# Patient Record
Sex: FEMALE | Race: WHITE
[De-identification: names, ages, dates, MRNs, and addresses within clinical notes are randomized per-mention and may not be internally consistent; named-entity substitution may affect disease eponyms.]

---

## 2017-01-03 ENCOUNTER — HOSPITAL ENCOUNTER (EMERGENCY)
Dept: HOSPITAL 25 - ED | Age: 52
LOS: 1 days | Discharge: HOME | End: 2017-01-04
Payer: COMMERCIAL

## 2017-01-03 VITALS — DIASTOLIC BLOOD PRESSURE: 78 MMHG | SYSTOLIC BLOOD PRESSURE: 133 MMHG

## 2017-01-03 DIAGNOSIS — Z87.19: ICD-10-CM

## 2017-01-03 DIAGNOSIS — R07.9: Primary | ICD-10-CM

## 2017-01-03 LAB
ALBUMIN SERPL BCG-MCNC: 4.4 G/DL (ref 3.2–5.2)
ALP SERPL-CCNC: 80 U/L (ref 34–104)
ALT SERPL W P-5'-P-CCNC: 25 U/L (ref 7–52)
ANION GAP SERPL CALC-SCNC: 8 MMOL/L (ref 2–11)
AST SERPL-CCNC: 18 U/L (ref 13–39)
BUN SERPL-MCNC: 18 MG/DL (ref 6–24)
BUN/CREAT SERPL: 22.2 (ref 8–20)
CALCIUM SERPL-MCNC: 9.7 MG/DL (ref 8.6–10.3)
CHLORIDE SERPL-SCNC: 103 MMOL/L (ref 101–111)
CK SERPL-CCNC: 58 U/L (ref 10–223)
GLOBULIN SER CALC-MCNC: 2.9 G/DL (ref 2–4)
GLUCOSE SERPL-MCNC: 103 MG/DL (ref 70–100)
HCO3 SERPL-SCNC: 27 MMOL/L (ref 22–32)
HCT VFR BLD AUTO: 41 % (ref 35–47)
HGB BLD-MCNC: 13.5 G/DL (ref 12–16)
MCH RBC QN AUTO: 28 PG (ref 27–31)
MCHC RBC AUTO-ENTMCNC: 33 G/DL (ref 31–36)
MCV RBC AUTO: 84 FL (ref 80–97)
POTASSIUM SERPL-SCNC: 3.8 MMOL/L (ref 3.5–5)
PROT SERPL-MCNC: 7.3 G/DL (ref 6.4–8.9)
RBC # BLD AUTO: 4.81 10^6/UL (ref 4–5.4)
SODIUM SERPL-SCNC: 138 MMOL/L (ref 133–145)
TROPONIN I SERPL-MCNC: 0.02 NG/ML (ref ?–0.04)
WBC # BLD AUTO: 8.2 10^3/UL (ref 3.5–10.8)

## 2017-01-03 PROCEDURE — 85025 COMPLETE CBC W/AUTO DIFF WBC: CPT

## 2017-01-03 PROCEDURE — 99283 EMERGENCY DEPT VISIT LOW MDM: CPT

## 2017-01-03 PROCEDURE — 71010: CPT

## 2017-01-03 PROCEDURE — 83880 ASSAY OF NATRIURETIC PEPTIDE: CPT

## 2017-01-03 PROCEDURE — 83874 ASSAY OF MYOGLOBIN: CPT

## 2017-01-03 PROCEDURE — 83690 ASSAY OF LIPASE: CPT

## 2017-01-03 PROCEDURE — 85730 THROMBOPLASTIN TIME PARTIAL: CPT

## 2017-01-03 PROCEDURE — 82550 ASSAY OF CK (CPK): CPT

## 2017-01-03 PROCEDURE — 80053 COMPREHEN METABOLIC PANEL: CPT

## 2017-01-03 PROCEDURE — 84484 ASSAY OF TROPONIN QUANT: CPT

## 2017-01-03 PROCEDURE — 36415 COLL VENOUS BLD VENIPUNCTURE: CPT

## 2017-01-03 PROCEDURE — 82553 CREATINE MB FRACTION: CPT

## 2017-01-03 PROCEDURE — 83605 ASSAY OF LACTIC ACID: CPT

## 2017-01-03 PROCEDURE — 93005 ELECTROCARDIOGRAM TRACING: CPT

## 2017-01-03 PROCEDURE — 85610 PROTHROMBIN TIME: CPT

## 2017-01-03 NOTE — RAD
INDICATION: Chest pain



COMPARISON: Similar chest x-ray dated August 28, 2013

 

TECHNIQUE: Single AP portable view of the chest was obtained.



FINDINGS: 



Image quality is compromised due to the relative inferiority of a portable chest x-ray.



The heart and mediastinum exhibit normal size and contour.



The lungs are grossly clear. There is no evidence of a large pleural effusion.



Visualized bones are normal for the patient's age.



IMPRESSION:  No radiographic evidence for acute cardiopulmonary abnormality on this

portable chest x-ray.

## 2017-01-04 LAB — LIPASE SERPL-CCNC: 28 U/L (ref 11–82)

## 2017-01-06 NOTE — ED
John VOSS Rebecca, scribed for Isaac Diaz MD on 01/03/17 at 2103 .





HPI Chest Pain





- HPI Summary


HPI Summary: 


Pt is a 50 y/o F who presents to ED c/o CP. Pain began suddenly 3 days ago, 

worsened today. Pain is characterized as pressure and heat, currently ranked as 

moderate (6/10). Pain is in the low sternum with radiation to the back, between 

the shoulder blades. Sx aggravated by coffee, slightly alleviated by Prilosec. 

Additionally c/o belching and shaking. Denies SOB, nausea, diaphoresis, diarrhea

, changes in urinary frequency, fever, chills, cough, and melena. PMHx GERD, 

pancreatitis. Last stress test was a few years ago. Prior similar episodes 

during pancreatitis, GERD, and food/medication allergy reactions. 





IF THERE IS ONE, PLEASE SEE DICTATION BY DR. DIAZ FOR FURTHER INFORMATION.





- History of Current Complaint


Chief Complaint: EDChestPainROMI


Time Seen by Provider: 01/03/17 20:57


Hx Obtained From: Patient


Onset/Duration: Started Days Ago - 3 days ago, Still Present, Worse Since - 

today


Timing: Constant


Initial Severity: Mild


Current Severity: Moderate


Pain Intensity: 6


Pain Scale Used: 0-10 Numeric


Chest Pain Location: Lower Sternal


Chest Pain Radiates: Yes


Chest Pain Radiates To:: Back - between shoulder blades


Character: Burning, Pressure/Squeezing


Aggravating Factor(s): Caffeine - Coffee


Alleviating Factor(s): OTC Meds - Prilosec


Associated Signs and Symptoms: Positive: Other: - Belching and shaking; Denies 

diarrhea, changes in urinary frequency and melena.  Negative: Shortness of 

Breath, Fever, Chills, Diaphoresis, Nausea





- Allergy/Home Medications


Allergies/Adverse Reactions: 


 Allergies











Allergy/AdvReac Type Severity Reaction Status Date / Time


 


Adhesive Tape Allergy Intermediate Rash Verified 11/17/16 09:34





[Tegaderm Dressing]     


 


Allopurinol Allergy Intermediate Hives Verified 11/17/16 09:34


 


Cefuroxime [From Ceftin] Allergy Intermediate Rash Verified 11/17/16 09:34


 


Chicken Allergy Allergy Intermediate Anxiety Verified 11/17/16 09:34


 


Fexofenadine [From Allegra] Allergy Intermediate Rash Verified 11/17/16 09:34


 


Garlic Allergy Intermediate Rash Verified 11/17/16 09:34


 


Levofloxacin [From Levaquin] Allergy Intermediate Hives Verified 11/17/16 09:34


 


Penicillin V Allergy Intermediate Rash Verified 11/17/16 09:34


 


Sulfamethoxazole Allergy Intermediate Rash Verified 11/17/16 09:34





w/Trimethoprim     





[From Bactrim]     


 


Watermelon Flavor Allergy Intermediate Anxiety Verified 11/17/16 09:34


 


Cholecystokinin Allergy Mild Flushing Verified 11/17/16 09:34


 


Technetium-99M Allergy Mild Flushing Verified 11/17/16 09:34


 


Nitrofurantoin Allergy Unknown SEVERE Verified 11/17/16 09:34





[From Macrobid]   NAUSEA AND  





   HIVES  


 


Hydrochlorothiazide Allergy  ITCHY Verified 11/17/16 09:34





   HIVES AND  





   FLUSHING  


 


Latex Allergy  Rash Verified 11/17/16 09:34


 


Oxycodone Allergy  FLUSHING Verified 11/17/16 09:34





   AND ITCHY  





   HIVES  


 


Statins Allergy  SEVERLY Verified 11/17/16 09:34





   DEBILITATED  


 


Sulfa Antibiotics Allergy  Unknown Verified 01/03/17 20:28





   Reaction  





   Details  


 


peppers Allergy Intermediate Anxiety Uncoded 11/17/16 09:34


 


treenuts Allergy Intermediate Anxiety Uncoded 11/17/16 09:34














PMH/Surg Hx/FS Hx/Imm Hx


Endocrine/Hematology History: 


   Denies: Hx Diabetes


Cardiovascular History: Reports: Hx Hypercholesterolemia, Hx Hypertension


   Denies: Hx Pacemaker/ICD


Respiratory History: Reports: Hx Asthma


GI History: Reports: Hx Gastroesophageal Reflux Disease, Other GI Disorders - 

Hx pancreatitis


 History: Reports: Hx Kidney Stones, Other  Problems/Disorders - Left 

kidney removed.


Sensory History: 


   Denies: Hx Hearing Aid


Psychiatric History: 


   Denies: Hx Panic Disorder





- Cancer History


Hx Chemotherapy: No


Hx Radiation Therapy: No





- Surgical History


Surgery Procedure, Year, and Place: BREAST REDUCTION 86,HYSTERECTOMY 96, LT 

KIDNEY REMOVED 2008,HERNIA REPAIR AS [facility]


Infectious Disease History: No


Infectious Disease History: 


   Denies: Traveled Outside the [place] in Last 30 Days





- Family History


Known Family History: Positive: Hypertension, Other


Family History: HLD





- Social History


Alcohol Use: Rare


Hx Substance Use: No


Substance Use Type: Reports: None


Hx Tobacco Use: No


Smoking Status (MU): Never Smoked Tobacco





Review of Systems





- ROS Summary


Review of Systems Summary: 





IF THERE IS ONE, PLEASE SEE DICTATION BY DR. DIAZ FOR FURTHER INFORMATION.


Positive: Other - Shaking.  Negative: Fever, Chills, Skin Diaphoresis


Positive: Chest Pain - Lower sternal heat/pressure


Negative: Cough


Positive: Other - Belching.  Negative: Diarrhea, Nausea


Positive: other - Denies melena.  Negative: frequency


All Other Systems Reviewed And Are Negative: Yes





Physical Exam





- Summary


Physical Exam Summary: 





GENERAL: Awake, alert, oriented, no acute distress, very pleasant


HEAD/FACE: Head is normocephalic, atraumatic


EYES: Anicteric sclera, clear conjunctiva


ENT: Mucous membranes moist, no erythema, no discharge, no lesions, neck is 

supple, trachea is midline, no JVD


CARDIAC: Regular rate and rhythm, S1, S2, no rub, no murmur, no gallop, 2+ 

radial and pedal pulses bilaterally


RESPIRATORY: Clear to auscultation bilaterally with no rales, rhonchi, or 

wheezes, non-tender


ABDOMEN: Bowel sounds positive, no bruit, soft, non-tender, no CVA tenderness


EXTREMITIES: No edema, warm, dry, moving all extremities in a grossly normal 

manner


NEUROLOGICAL: Mood is appropriate, moving all extremities in a grossly normal 

manner





IF THERE IS ONE, PLEASE SEE DICTATION BY DR. DIAZ FOR FURTHER INFORMATION.


Triage Information Reviewed: Yes


Vital Signs On Initial Exam: 


 Initial Vitals











Temp Pulse Resp BP Pulse Ox


 


 99.1 F   72   20   157/93   99 


 


 01/03/17 20:23  01/03/17 20:23  01/03/17 20:23  01/03/17 20:23  01/03/17 20:23











Vital Signs Reviewed: Yes





Diagnostics





- Vital Signs


 Vital Signs











  Temp Pulse Resp BP Pulse Ox


 


 01/03/17 20:23  99.1 F  72  20  157/93  99














- Laboratory


Result Diagrams: 


 01/03/17 22:30





 01/03/17 22:30


Lab Statement: Any lab studies that have been ordered have been reviewed, and 

results considered in the medical decision making process.





- Radiology


  ** CXR


Xray Interpretation: No Acute Changes


Radiology Interpretation Completed By: Radiologist





- EKG


  ** 2034


Cardiac Rate: NL - 65 bpm


EKG Rhythm: Sinus Rhythm


EKG Interpretation: No acute ischemia





Re-Evaluation





- Re-Evaluation


  ** First Eval


Re-Evaluation Time: 00:00


Change: Improved


Comment: Discussed current lab results and when the 2nd troponin draw will be. 

Improved, no symptoms





Chest Pain Course/Dx





- Diagnoses


Provider Diagnoses: 


 Chest pain








Discharge





- Discharge Plan


Condition: Stable


Disposition: OTHER


Discharge Disposition Comment: Pt will be signed out to ed [facility] 

pending, awaiting 2nd Troponin


Patient Education Materials:  Chest Pain (ED)


Referrals: 


Ellen Pearl NP [Primary Care Provider] - 


Additional Instructions: 


PLEASE RETURN TO THE EMERGENCY DEPARTMENT FOR NAUSEA, VOMITING, FEVER, 

PHOTOPHOBIA, CHEST PAIN OR IF SYMPTOMS WORSEN.





The documentation as recorded by the John quach Rebecca accurately 

reflects the service I personally performed and the decisions made by me, 

Isaac Diaz MD.

## 2017-04-07 NOTE — ED
Naima VOSS Erika, scribed for J Carlos Petit MD on 01/04/17 at 0326 .





Progress





- Progress Note


Progress Note: 


Repeat troponin negative. Pt to be discharged home stable.





Course/Dx





- Diagnoses


Provider Diagnoses: 


 Chest pain








The documentation as recorded by the Naima quach Erika accurately reflects 

the service I personally performed and the decisions made by Damaso celis David, MD.

## 2019-04-22 ENCOUNTER — HOSPITAL ENCOUNTER (OUTPATIENT)
Dept: HOSPITAL 25 - ED | Age: 54
Setting detail: OBSERVATION
LOS: 1 days | Discharge: HOME | End: 2019-04-23
Attending: INTERNAL MEDICINE | Admitting: INTERNAL MEDICINE
Payer: COMMERCIAL

## 2019-04-22 DIAGNOSIS — Z88.1: ICD-10-CM

## 2019-04-22 DIAGNOSIS — Z88.0: ICD-10-CM

## 2019-04-22 DIAGNOSIS — R55: Primary | ICD-10-CM

## 2019-04-22 DIAGNOSIS — I10: ICD-10-CM

## 2019-04-22 DIAGNOSIS — E78.5: ICD-10-CM

## 2019-04-22 DIAGNOSIS — Z88.8: ICD-10-CM

## 2019-04-22 DIAGNOSIS — Z90.710: ICD-10-CM

## 2019-04-22 DIAGNOSIS — R07.9: ICD-10-CM

## 2019-04-22 DIAGNOSIS — K21.9: ICD-10-CM

## 2019-04-22 DIAGNOSIS — Z90.5: ICD-10-CM

## 2019-04-22 DIAGNOSIS — Z91.018: ICD-10-CM

## 2019-04-22 DIAGNOSIS — Z79.899: ICD-10-CM

## 2019-04-22 LAB
ALBUMIN SERPL BCG-MCNC: 4.6 G/DL (ref 3.2–5.2)
ALBUMIN/GLOB SERPL: 1.8 {RATIO} (ref 1–3)
ALP SERPL-CCNC: 89 U/L (ref 34–104)
ALT SERPL W P-5'-P-CCNC: 28 U/L (ref 7–52)
ANION GAP SERPL CALC-SCNC: 9 MMOL/L (ref 2–11)
AST SERPL-CCNC: 18 U/L (ref 13–39)
BASOPHILS # BLD AUTO: 0 10^3/UL (ref 0–0.2)
BUN SERPL-MCNC: 13 MG/DL (ref 6–24)
BUN/CREAT SERPL: 11.9 (ref 8–20)
CALCIUM SERPL-MCNC: 9.9 MG/DL (ref 8.6–10.3)
CHLORIDE SERPL-SCNC: 105 MMOL/L (ref 101–111)
EOSINOPHIL # BLD AUTO: 0.1 10^3/UL (ref 0–0.6)
GLOBULIN SER CALC-MCNC: 2.6 G/DL (ref 2–4)
GLUCOSE SERPL-MCNC: 152 MG/DL (ref 70–100)
HCG SERPL QL: 1.63 MIU/ML
HCO3 SERPL-SCNC: 26 MMOL/L (ref 22–32)
HCT VFR BLD AUTO: 40 % (ref 33–41)
HGB BLD-MCNC: 13.5 G/DL (ref 12–16)
INR PPP/BLD: 0.92 (ref 0.82–1.09)
LYMPHOCYTES # BLD AUTO: 1.8 10^3/UL (ref 1–4.8)
MAGNESIUM SERPL-MCNC: 2.1 MG/DL (ref 1.9–2.7)
MCH RBC QN AUTO: 29 PG (ref 27–31)
MCHC RBC AUTO-ENTMCNC: 34 G/DL (ref 31–36)
MCV RBC AUTO: 85 FL (ref 80–97)
MONOCYTES # BLD AUTO: 0.4 10^3/UL (ref 0–0.8)
NEUTROPHILS # BLD AUTO: 3.5 10^3/UL (ref 1.5–7.7)
NRBC # BLD AUTO: 0 10^3/UL
NRBC BLD QL AUTO: 0.1
PLATELET # BLD AUTO: 234 10^3/UL (ref 150–450)
POTASSIUM SERPL-SCNC: 3.9 MMOL/L (ref 3.5–5)
PROT SERPL-MCNC: 7.2 G/DL (ref 6.4–8.9)
RBC # BLD AUTO: 4.66 10^6 /UL (ref 3.7–4.87)
SODIUM SERPL-SCNC: 140 MMOL/L (ref 135–145)
T4 SERPL-MCNC: 10.98 MCG/DL (ref 6.09–12.23)
TROPONIN I SERPL-MCNC: 0.04 NG/ML (ref ?–0.04)
TSH SERPL-ACNC: 2.17 MCIU/ML (ref 0.34–5.6)
WBC # BLD AUTO: 5.8 10^3/UL (ref 3.5–10.8)

## 2019-04-22 PROCEDURE — 71046 X-RAY EXAM CHEST 2 VIEWS: CPT

## 2019-04-22 PROCEDURE — 83605 ASSAY OF LACTIC ACID: CPT

## 2019-04-22 PROCEDURE — 96361 HYDRATE IV INFUSION ADD-ON: CPT

## 2019-04-22 PROCEDURE — 84443 ASSAY THYROID STIM HORMONE: CPT

## 2019-04-22 PROCEDURE — 81003 URINALYSIS AUTO W/O SCOPE: CPT

## 2019-04-22 PROCEDURE — 70551 MRI BRAIN STEM W/O DYE: CPT

## 2019-04-22 PROCEDURE — 84436 ASSAY OF TOTAL THYROXINE: CPT

## 2019-04-22 PROCEDURE — 83880 ASSAY OF NATRIURETIC PEPTIDE: CPT

## 2019-04-22 PROCEDURE — 93005 ELECTROCARDIOGRAM TRACING: CPT

## 2019-04-22 PROCEDURE — 80048 BASIC METABOLIC PNL TOTAL CA: CPT

## 2019-04-22 PROCEDURE — 86140 C-REACTIVE PROTEIN: CPT

## 2019-04-22 PROCEDURE — 96360 HYDRATION IV INFUSION INIT: CPT

## 2019-04-22 PROCEDURE — 85025 COMPLETE CBC W/AUTO DIFF WBC: CPT

## 2019-04-22 PROCEDURE — 80053 COMPREHEN METABOLIC PANEL: CPT

## 2019-04-22 PROCEDURE — 85610 PROTHROMBIN TIME: CPT

## 2019-04-22 PROCEDURE — 70544 MR ANGIOGRAPHY HEAD W/O DYE: CPT

## 2019-04-22 PROCEDURE — 99284 EMERGENCY DEPT VISIT MOD MDM: CPT

## 2019-04-22 PROCEDURE — 36415 COLL VENOUS BLD VENIPUNCTURE: CPT

## 2019-04-22 PROCEDURE — G0378 HOSPITAL OBSERVATION PER HR: HCPCS

## 2019-04-22 PROCEDURE — 84702 CHORIONIC GONADOTROPIN TEST: CPT

## 2019-04-22 PROCEDURE — 84484 ASSAY OF TROPONIN QUANT: CPT

## 2019-04-22 PROCEDURE — 83735 ASSAY OF MAGNESIUM: CPT

## 2019-04-22 PROCEDURE — 85379 FIBRIN DEGRADATION QUANT: CPT

## 2019-04-22 NOTE — HP
CC:  Frank Case NP *

 

HISTORY AND PHYSICAL:

 

DATE OF ADMISSION:  19

 

PRIMARY CARE PROVIDER:  Frank Case NP

 

CHIEF COMPLAINT:  Dizzy, feeling unwell, almost fainting.

 

HISTORY OF PRESENT ILLNESS:  Morelia Leyva is a 53-year-old female with 
history of hypertension, dyslipidemia, gastroesophageal reflux disease, status 
post left total nephrectomy for kidney stone in the past who presented to the 
hospital complaining of feeling unwell, weak, and near-syncopal.  The patient 
stated that she had experienced a short-lasting and limited substernal pressure 
when her heart was pounding, beating regularly but very fast and that was at 
the time when she was alone at home feeling like she is going to pass out.  At 
that point, she also was slightly dizzy.  There were no visual disturbances and 
no focal weakness noted. She denied shortness of breath.  The pain lasted 
minutes, the patient called 911, and it resolved by the time she presented to 
the ED.

 

In addition to that, the patient stated that she had tooth extraction 3 days ago
, it was right-sided bottom wisdom tooth.  Since then she had been on a liquid 
diet for the past 2 days.

 

PAST MEDICAL HISTORY:

1.  History of dyslipidemia.

2.  Hypertension.

3.  Gastroesophageal reflux disease.

4.  Irritable bowel syndrome.

5.  History of left total nephrectomy due to kidney stone.

6.  History of the right middle trigger finger release in 2019.

7.  Right wisdom tooth resection 19 by Dr. Rajput.

8.  History of breast reduction.

9.  History of total abdominal hysterectomy.

 

MEDICATIONS AT HOME:  Does include:

1.  Praluent 75 mg subcutaneously every 14 days.

2.  Fioricet 1 tablet every 6 hours p.r.n.

3.  Amlodipine 10 mg daily.

4.  Tenormin 50 mg daily.

 

ALLERGIES:  Multiple and include ADHESIVE TAPE, ALLOPURINOL, CEFUROXIME, CHICKEN
, FEXOFENADINE, GARLIC, LEVOFLOXACIN, PENICILLIN, BACTRIM, WATERMELON, 
CHOLECYSTOKININ, TECHNETIUM 99, MACROBID, BANANA, HYDROCHLOROTHIAZIDE, OXYCODONE
, LATEX, PEPPERS, TREE NUTS.  Most of those cause rash or hives, BANANA causes 
difficulty breathing, and NITROFURANTOIN causes severe nausea and vomiting.

 

FAMILY HISTORY:  Positive for mother with history of hypertension and TIA, who 
is well at the age of 78.  Father  at the age of 32 secondary to heart 
disease.

 

SOCIAL HISTORY:  The patient is commercial .  She denies any 
alcohol, tobacco, or drug use.  Her surrogate is her , Brian.  The 
patient stated that she just vacationed at the [place] and she flew back 
2 weeks ago.

 

REVIEW OF SYSTEMS:  Please see history of present illness.  The patient stated 
that ever since her tooth extraction she felt weak and unwell.  She could not 
further describe it.  Her p.o. intake had been limited due to limited diet.  
She had not been febrile.  She had seen Dr. Rajput today in the morning and 
she stated that everything looked fine from the oral surgery standpoint.

 

The remaining 12 systems were reviewed with the patient and were otherwise 
negative.

 

                               PHYSICAL EXAMINATION

 

GENERAL:  The patient is a very pleasant 53-year-old female who is in no acute 
distress.  Alert and oriented x3.

 

VITAL SIGNS:  Blood pressure of 167/79, heart rate of 64 and regular, 
respiratory rate 18, oxygen saturation 95% on room air, temperature of 99.2.

 

HEENT:  Head:  Atraumatic, normocephalic.  Eyes:  Pupils are equal and reactive 
to light and accommodation.  Oropharynx is clear.  Mucosa moist.

 

NECK:  Supple.  No JVD.  No bruit bilaterally.

 

RESPIRATORY:  Clear to auscultation bilaterally.

 

CARDIOVASCULAR:  Regular rate and rhythm.  No murmur.

 

ABDOMEN:  Soft, nontender.  Bowel sounds are present in all 4 quadrants.

 

EXTREMITIES:  There is no edema.  Pulses are +2 bilaterally.  No clubbing and 
cyanosis.

 

NEURO EVALUATION:  Speech clear.  Cranial nerves II through XII grossly intact. 
Motor strength is 5/5 in bilaterally.

 

PSYCHIATRIC EVALUATION:  Alert and oriented x3, with no evidence of anxiety or 
depression.

 

 DIAGNOSTIC STUDIES/LAB DATA:  Showed sodium of 140, potassium 3.9, chloride 105
, carbon dioxide 26, BUN 13, creatinine 1.09.  Liver function test 
unremarkable. Glucose of 152, lactic acid 1.9.  Troponin of 0.04, repeat 
troponin was 0.03.  C- reactive protein was 3.3.  TSH of 2.17.  D-dimer was 
below detectable.  INR of 0.92.

 

White blood cell count 5.8, hemoglobin of 13.5, hematocrit 40, and platelets of 
234.

 

MRI of the head obtained on 19, impression:  "No aneurysm, vascular 
malformation, occlusion, or stenosis of the visualized intracranial circulation.
"
 

Brain MRI, impression:  "Stable right frontal extraaxial fluid collection, 
without significant change from 2014, consistent with arachnoid cyst."

 

Portable chest x-ray, impression:  "No active cardiopulmonary disease."

 

The patient's EKG showed a normal sinus rhythm with heart rate 77 beats per 
minute with no ST changes.

 

ASSESSMENT AND PLAN:

1.  The patient appears to be near-syncopal with substernal chest pressure 
noted. At this point, differential would include possibility of pulmonary 
embolism, although the patient's D-dimer is negative and she is not hypoxemic.  
Patient also could have been mildly dehydrated.  Her creatinine indicates 
possibly mild dehydration.  The patient also had been on limited diet for the 
past 3 days due to her recent tooth extraction.  At this point, she is going to 
be placed on gentle intravenous hydration and evaluated with a cardiac stress 
test in the morning if her troponins continued to be negative.

2.  In regards to her hypertension.  The patient is going to be continued on 
her outpatient atenolol as well as amlodipine.

3.  For DVT prophylaxis.  The patient is going to be placed on routine 
ambulation.  The patient is otherwise low risk for DVT.

4.  The patient's code status is full.  Her surrogate is her .

 

TIME SPENT:  Approximately 60 minutes was spent on admission of this patient, 
more than half that time was spent face-to-face with the patient during the 
interview and physical exam.

 

 

 

[id number]/CPS #: [id number]

BUCKY

## 2019-04-22 NOTE — ED
HPI Cardiac





- HPI Summary


HPI Summary: 


Patient is a 53-year-old female presents to the ED with approximately 45 minute 

episode of dizziness, feeling she had the inability to focus, midsternal chest 

pain which was nonradiating and shakiness.  She does endorse some feelings of 

"palpitations" but this was fleeting.  She is a current patient of Dr. Tejada 

for hypertension and is currently on amlodipine and atenolol.  Family history 

includes father  of CHF at 32 years old.  She also has a history of 

arachnoid cyst which the last CT scan was over 7 years ago.  She states she was 

post to have a subsequent scan prior to this, but never had it done.  She is 

currently rating her pain 1/10.  She is very shaky on arrival.  She states she 

arrived by EMS and her glucose and BP were both normal prior to arrival.








- History of Current Complaint


Chief Complaint: EDDizziness


Stated Complaint: CHEST PAIN/SHAKS/SWEATING PER PT HUS


Time Seen by Provider: 19 13:47


Hx Obtained From: Patient


Onset/Duration: Started Hours Ago


Timing: Constant


Initial Severity: Moderate


Current Severity: Moderate


Pain Intensity: 0


Pain Scale Used: 0-10 Numeric


Chest Pain Radiates: No


Aggravating Factor(s): Nothing


Alleviating Factor(s): Nothing


Associated Signs and Symptoms: Positive: Chest Pain, Anxiety, Palpitations.  

Negative: Lightheadedness





- Risk Factors


Pulmonary Embolism Risk Factors: Negative


Cardiac Risk Factors: Negative


Atrial Fibrillation Risk Factors: Hypertension


TAD Risk Factors: Negative





- Allergy/Home Medications


Allergies/Adverse Reactions: 


 Allergies











Allergy/AdvReac Type Severity Reaction Status Date / Time


 


Adhesive Tape Allergy Intermediate Rash Verified 19 06:34





[Tegaderm Dressing]     


 


MS Allopurinol [Allopurinol] Allergy Intermediate Hives Verified 19 06:34


 


MS Cefuroxime [From Ceftin] Allergy Intermediate Rash Verified 19 06:34


 


MS Chicken Allergy Allergy Intermediate Anxiety Verified 19 06:34





[Chicken Allergy]     


 


MS Fexofenadine Allergy Intermediate Rash Verified 19 06:34





[From Allegra]     


 


MS Garlic [Garlic] Allergy Intermediate Rash Verified 19 06:34


 


MS Levofloxacin Allergy Intermediate Hives Verified 19 06:34





[From Levaquin]     


 


MS Penicillin V Allergy Intermediate Rash Verified 19 06:34





[Penicillin V]     


 


MS Sulfamethoxazole Allergy Intermediate Rash Verified 19 06:34





w/Trimethoprim     





[From Bactrim]     


 


MS Watermelon Flavor Allergy Intermediate Anxiety Verified 19 06:34





[Watermelon Flavor]     


 


MS Cholecystokinin Allergy Mild Flushing Verified 19 06:34





[Cholecystokinin]     


 


MS Technetium-99M Allergy Mild Flushing Verified 19 06:34





[Technetium-99M]     


 


MS Nitrofurantoin Allergy Unknown SEVERE Verified 19 06:34





[From Macrobid]   NAUSEA AND  





   HIVES  


 


MS Banana [Banana] Allergy  Difficulty Verified 19 06:34





   Breathing/Wheezing  


 


MS Hydrochlorothiazide Allergy  ITCHY Verified 19 06:34





[Hydrochlorothiazide]   HIVES AND  





   FLUSHING  


 


MS Latex [Latex] Allergy  Rash Verified 19 06:34


 


MS Oxycodone [Oxycodone] Allergy  FLUSHING Verified 19 06:34





   AND ITCHY  





   HIVES  


 


MS Statins [Statins] Allergy  SEVERLY Verified 19 06:34





   DEBILITATED  


 


MS Sulfa Antibiotics Allergy  Unknown Verified 19 06:34





[Sulfa Antibiotics]   Reaction  





   Details  


 


peppers Allergy Intermediate Anxiety Uncoded 19 06:34


 


treenuts Allergy Intermediate Anxiety Uncoded 19 06:34











Home Medications: 


 Home Medications





Alirocumab [Praluent] 75 mg SUBCUT Q14D 19 [History Confirmed 19]











PMH/Surg Hx/FS Hx/Imm Hx


Previously Healthy: Yes


Endocrine/Hematology History: 


   Denies: Hx Diabetes


Cardiovascular History: Reports: Hx Hypercholesterolemia, Hx Hypertension


   Denies: Hx Pacemaker/ICD


Respiratory History: Reports: Hx Asthma


GI History: Reports: Hx Gastroesophageal Reflux Disease, Other GI Disorders - 

Hx pancreatitis


 History: Reports: Hx Kidney Stones, Other  Problems/Disorders - Left 

kidney removed.


Sensory History: 


   Denies: Hx Hearing Aid


Psychiatric History: 


   Denies: Hx Panic Disorder





- Cancer History


Hx Chemotherapy: No


Hx Radiation Therapy: No





- Surgical History


Surgery Procedure, Year, and Place: BREAST REDUCTION 1986; HYSTERECTOMY 1996

; LEFT NEPHRECTOMY 2008; TRIGGER FINGER RIGHT HAND 2019





- Immunization History


Hx Pertussis Vaccination: No


Immunizations Up to Date: Yes


Infectious Disease History: No


Infectious Disease History: Reports: Traveled Outside the [place] in Last 30 Days - 

[facility]





- Family History


Known Family History: Positive: Hypertension, Other - HLD


Family History: HLD





- Social History


Occupation: Employed Full-time


Lives: With Family


Alcohol Use: Rare


Hx Substance Use: No


Substance Use Type: Reports: None


Hx Tobacco Use: No


Smoking Status (MU): Never Smoked Tobacco





Review of Systems


Constitutional: Negative


Negative: Fever, Chills, Fatigue, Skin Diaphoresis


Positive: Palpitations, Chest Pain


Negative: Shortness Of Breath, Cough


Negative: Abdominal Pain, Vomiting, Diarrhea, Nausea


Genitourinary: Negative


Positive: no symptoms reported, see HPI


Negative: Arthralgia, Myalgia


Skin: Negative


Neurological: Negative


Positive: Weakness.  Negative: Headache, Paresthesia, Numbness, Syncope


Positive: Anxious


All Other Systems Reviewed And Are Negative: Yes





NIH Scale





- NIH Scale


Level of Consciousness: Alert/Keenly Responsive


Ask Patient the Month and His/Her Age: Both Correct


Ask Pt to Open/Close Eyes and /Release Non-Paretic Hand: Both Correctly


Best Gaze (Only Horizontal Eye Movement): Normal


Visual Field Testing: No Visual Loss


Motor Function - Right Arm: No Drift-Holds 10 Seconds


Motor Function - Left Arm: No Drift-Holds 10 Seconds


Motor Function - Right Leg: No Drift-Holds 10 Seconds


Motor Function - Left Leg: No Drift-Holds 10 Seconds


Limb Ataxia-Must be out of Proportion to Weakness Present: Absent


Sensory (Use Pinprick to Test Arms/Legs/Trunk/Face): Normal


Best Language (Describe Picture, Name Items): No Aphasia


Dysarthria (Read Several Words): Normal


Extinction and Inattention: No Abnormality





Physical Exam


Triage Information Reviewed: Yes


Vital Signs On Initial Exam: 


 Initial Vitals











Temp Pulse Resp BP Pulse Ox


 


 98.8 F   85   16   175/97   98 


 


 19 13:44  19 13:44  19 13:44  19 13:44  19 13:44











Vital Signs Reviewed: Yes


Appearance: Positive: Well-Appearing, Well-Nourished


Skin: Positive: Warm, Skin Color Reflects Adequate Perfusion


Head/Face: Positive: Normal Head/Face Inspection


Eyes: Positive: EOMI, Conjunctiva Clear


Neck: Positive: Supple, No Lymphadenopathy


Respiratory/Lung Sounds: Positive: Clear to Auscultation, Breath Sounds Present


Cardiovascular: Positive: RRR, Pulses are Symmetrical in both Upper and Lower 

Extremities.  Negative: Leg Edema Left, Leg Edema Right


Musculoskeletal: Positive: Normal, Strength/ROM Intact


Neurological: Positive: Speech Normal


Psychiatric: Positive: Normal, Affect/Mood Appropriate


AVPU Assessment: Alert





Diagnostics





- Vital Signs


 Vital Signs











  Temp Pulse Resp BP Pulse Ox


 


 19 14:53   70  38  115/75  94


 


 19 14:52   64  18   97


 


 19 13:44  98.8 F  85  16  175/97  98














- Laboratory


Lab Results: 


 Lab Results











  19 Range/Units





  14:07 14:07 14:07 


 


WBC  5.8    (3.5-10.8)  10^3/uL


 


RBC  4.66    (3.70-4.87)  10^6 /uL


 


Hgb  13.5    (12.0-16.0)  g/dL


 


Hct  40    (33-41)  %


 


MCV  85    (80-97)  fL


 


MCH  29    (27-31)  pg


 


MCHC  34    (31-36)  g/dL


 


RDW  14    (10.5-15)  %


 


Plt Count  234    (150-450)  10^3/uL


 


MPV  9.3    (7.4-10.4)  fL


 


Neut % (Auto)  59.7    %


 


Lymph % (Auto)  31.6    %


 


Mono % (Auto)  6.2    %


 


Eos % (Auto)  1.9    %


 


Baso % (Auto)  0.6    %


 


Absolute Neuts (auto)  3.5    (1.5-7.7)  10^3/ul


 


Absolute Lymphs (auto)  1.8    (1.0-4.8)  10^3/ul


 


Absolute Monos (auto)  0.4    (0-0.8)  10^3/ul


 


Absolute Eos (auto)  0.1    (0-0.6)  10^3/ul


 


Absolute Basos (auto)  0    (0-0.2)  10^3/ul


 


Absolute Nucleated RBC  0    10^3/ul


 


Nucleated RBC %  0.1    


 


INR (Anticoag Therapy)   0.92   (0.82-1.09)  


 


D-Dimer, Quantitative   < 200   (Less Than 230)  ng/mL


 


Sodium    140  (135-145)  mmol/L


 


Potassium    3.9  (3.5-5.0)  mmol/L


 


Chloride    105  (101-111)  mmol/L


 


Carbon Dioxide    26  (22-32)  mmol/L


 


Anion Gap    9  (2-11)  mmol/L


 


BUN    13  (6-24)  mg/dL


 


Creatinine    1.09 H  (0.51-0.95)  mg/dL


 


Est GFR ( Amer)    63.1  (>60)  


 


Est GFR (Non-Af Amer)    52.1  (>60)  


 


BUN/Creatinine Ratio    11.9  (8-20)  


 


Glucose    152 H  ()  mg/dL


 


Lactic Acid     (0.5-2.0)  mmol/L


 


Calcium    9.9  (8.6-10.3)  mg/dL


 


Magnesium    2.1  (1.9-2.7)  mg/dL


 


Total Bilirubin    0.40  (0.2-1.0)  mg/dL


 


AST    18  (13-39)  U/L


 


ALT    28  (7-52)  U/L


 


Alkaline Phosphatase    89  ()  U/L


 


Troponin I    0.04 H*  (<0.04)  ng/mL


 


C-Reactive Protein    3.37  (<8.01)  mg/L


 


B-Natriuretic Peptide     (<=100)  pg/mL


 


Total Protein    7.2  (6.4-8.9)  g/dL


 


Albumin    4.6  (3.2-5.2)  g/dL


 


Globulin    2.6  (2-4)  g/dL


 


Albumin/Globulin Ratio    1.8  (1-3)  


 


TSH    2.17  (0.34-5.60)  mcIU/mL


 


Thyroxine (T4)    10.98  (6.09-12.23)  mcg/dL


 


Beta HCG, Quant    1.63  mIU/mL


 


Urine Color     


 


Urine Appearance     


 


Urine pH     (5-9)  


 


Ur Specific Gravity     (1.010-1.030)  


 


Urine Protein     (Negative)  


 


Urine Ketones     (Negative)  


 


Urine Blood     (Negative)  


 


Urine Nitrate     (Negative)  


 


Urine Bilirubin     (Negative)  


 


Urine Urobilinogen     (Negative)  


 


Ur Leukocyte Esterase     (Negative)  


 


Urine Glucose     (Negative)  














  19 Range/Units





  14:07 14:07 14:13 


 


WBC     (3.5-10.8)  10^3/uL


 


RBC     (3.70-4.87)  10^6 /uL


 


Hgb     (12.0-16.0)  g/dL


 


Hct     (33-41)  %


 


MCV     (80-97)  fL


 


MCH     (27-31)  pg


 


MCHC     (31-36)  g/dL


 


RDW     (10.5-15)  %


 


Plt Count     (150-450)  10^3/uL


 


MPV     (7.4-10.4)  fL


 


Neut % (Auto)     %


 


Lymph % (Auto)     %


 


Mono % (Auto)     %


 


Eos % (Auto)     %


 


Baso % (Auto)     %


 


Absolute Neuts (auto)     (1.5-7.7)  10^3/ul


 


Absolute Lymphs (auto)     (1.0-4.8)  10^3/ul


 


Absolute Monos (auto)     (0-0.8)  10^3/ul


 


Absolute Eos (auto)     (0-0.6)  10^3/ul


 


Absolute Basos (auto)     (0-0.2)  10^3/ul


 


Absolute Nucleated RBC     10^3/ul


 


Nucleated RBC %     


 


INR (Anticoag Therapy)     (0.82-1.09)  


 


D-Dimer, Quantitative     (Less Than 230)  ng/mL


 


Sodium     (135-145)  mmol/L


 


Potassium     (3.5-5.0)  mmol/L


 


Chloride     (101-111)  mmol/L


 


Carbon Dioxide     (22-32)  mmol/L


 


Anion Gap     (2-11)  mmol/L


 


BUN     (6-24)  mg/dL


 


Creatinine     (0.51-0.95)  mg/dL


 


Est GFR ( Amer)     (>60)  


 


Est GFR (Non-Af Amer)     (>60)  


 


BUN/Creatinine Ratio     (8-20)  


 


Glucose     ()  mg/dL


 


Lactic Acid  1.9    (0.5-2.0)  mmol/L


 


Calcium     (8.6-10.3)  mg/dL


 


Magnesium     (1.9-2.7)  mg/dL


 


Total Bilirubin     (0.2-1.0)  mg/dL


 


AST     (13-39)  U/L


 


ALT     (7-52)  U/L


 


Alkaline Phosphatase     ()  U/L


 


Troponin I     (<0.04)  ng/mL


 


C-Reactive Protein     (<8.01)  mg/L


 


B-Natriuretic Peptide   86   (<=100)  pg/mL


 


Total Protein     (6.4-8.9)  g/dL


 


Albumin     (3.2-5.2)  g/dL


 


Globulin     (2-4)  g/dL


 


Albumin/Globulin Ratio     (1-3)  


 


TSH     (0.34-5.60)  mcIU/mL


 


Thyroxine (T4)     (6.09-12.23)  mcg/dL


 


Beta HCG, Quant     mIU/mL


 


Urine Color    Colorless  


 


Urine Appearance    Clear  


 


Urine pH    7.0  (5-9)  


 


Ur Specific Gravity    1.002 L  (1.010-1.030)  


 


Urine Protein    Negative  (Negative)  


 


Urine Ketones    Negative  (Negative)  


 


Urine Blood    Negative  (Negative)  


 


Urine Nitrate    Negative  (Negative)  


 


Urine Bilirubin    Negative  (Negative)  


 


Urine Urobilinogen    Negative  (Negative)  


 


Ur Leukocyte Esterase    Negative  (Negative)  


 


Urine Glucose    Negative  (Negative)  











Result Diagrams: 


 19 14:07





 19 14:07


Lab Statement: Any lab studies that have been ordered have been reviewed, and 

results considered in the medical decision making process.





Disposition





- Course


Course Of Treatment: During the course of treatment the patient's evaluated for 

feelings of dizziness, inability to focus, heart racing, midsternal chest pain 

and shakiness.  On arrival by way of EMS, glucose level was within normal limits

, and BP was normal.  Vital signs are stable on arrival.  She does endorse a 1/

10 chest pain, however prior to arrival she was endorsing 8/10 chest pain.  

Denies any shortness of breath with this.  She states she has never had 

anything like this before.  She does see Dr. Hodge for hypertension and is 

currently taking atenolol and amlodipine.  Labs obtained which are all WNL 

except for an elevated troponin at 0.04.  She will be admitted for elevated 

trop.  Dr. Archer recommends MRI/MRI.





- Differential Dx - Cardiopulmonary


Differential Diagnoses - Cardiopulmonary: Other - CAD, anxiety, tachycardia, 

midsternal chest pain, dizziness





- Diagnoses


Provider Diagnoses: 


 Dizziness, Chest pain








- Physician Notifications


Discussed Care Of Patient With: Mindy Archer


Instructed by Provider To: Admit As Inpatient





Discharge





- Sign-Out/Discharge


Documenting (check all that apply): Patient Departure


Patient Received Moderate/Deep Sedation with Procedure: No





- Discharge Plan


Condition: Fair


Disposition: ADMITTED TO [facility]


Referrals: 


Frank Case NP [Primary Care Provider] - 





- Billing Disposition and Condition


Condition: FAIR


Disposition: Admitted to [facility]

## 2019-04-23 VITALS — DIASTOLIC BLOOD PRESSURE: 68 MMHG | SYSTOLIC BLOOD PRESSURE: 131 MMHG

## 2019-04-23 LAB
ANION GAP SERPL CALC-SCNC: 8 MMOL/L (ref 2–11)
BASOPHILS # BLD AUTO: 0 10^3/UL (ref 0–0.2)
BUN SERPL-MCNC: 11 MG/DL (ref 6–24)
BUN/CREAT SERPL: 14.7 (ref 8–20)
CALCIUM SERPL-MCNC: 9.1 MG/DL (ref 8.6–10.3)
CHLORIDE SERPL-SCNC: 107 MMOL/L (ref 101–111)
EOSINOPHIL # BLD AUTO: 0.1 10^3/UL (ref 0–0.6)
GLUCOSE SERPL-MCNC: 92 MG/DL (ref 70–100)
HCO3 SERPL-SCNC: 26 MMOL/L (ref 22–32)
HCT VFR BLD AUTO: 38 % (ref 33–41)
HGB BLD-MCNC: 12.4 G/DL (ref 12–16)
LYMPHOCYTES # BLD AUTO: 2.2 10^3/UL (ref 1–4.8)
MCH RBC QN AUTO: 28 PG (ref 27–31)
MCHC RBC AUTO-ENTMCNC: 33 G/DL (ref 31–36)
MCV RBC AUTO: 85 FL (ref 80–97)
MONOCYTES # BLD AUTO: 0.5 10^3/UL (ref 0–0.8)
NEUTROPHILS # BLD AUTO: 3.5 10^3/UL (ref 1.5–7.7)
NRBC # BLD AUTO: 0 10^3/UL
NRBC BLD QL AUTO: 0
PLATELET # BLD AUTO: 215 10^3/UL (ref 150–450)
POTASSIUM SERPL-SCNC: 3.7 MMOL/L (ref 3.5–5)
RBC # BLD AUTO: 4.4 10^6 /UL (ref 3.7–4.87)
SODIUM SERPL-SCNC: 141 MMOL/L (ref 135–145)
WBC # BLD AUTO: 6.2 10^3/UL (ref 3.5–10.8)

## 2019-04-23 NOTE — DS
DISCHARGE SUMMARY:

 

DATE OF ADMISSION:  04/22/19

 

DATE OF DISCHARGE:  04/23/19

 

PRIMARY CARE PROVIDER:  Frank Case NP.

 

DISCHARGE DIAGNOSES:

1.  Near-syncope and chest pain likely related to mild dehydration in a patient 
who was on limited diet due to tooth extraction.

2.  Chest pain with negative treadmill stress test performed on the day of 
discharge.

 

MEDICATIONS AT DISCHARGE:  Unchanged from admission and include:

1.  Praluent 75 mg subcutaneously every 14 days.

2.  Amlodipine 10 mg daily.

3.  Atenolol 50 mg daily.

4.  Fioricet on a p.r.n. basis.

5.  Clarinex 5 mg daily p.r.n.

 

LABORATORY DATA AND STUDIES PERFORMED DURING THE HOSPITAL STAY:  On 04/22/19, 
white blood cell count was 6.2, hemoglobin 12.4, hematocrit 38, and platelets 
of 215. The patient's troponin was 0.04, 0.03, and 0.03.  

D-dimer was below detectable. Sodium 141, potassium 3.7, chloride 107, carbon 
dioxide 26, BUN 11, creatinine 0.75.

 

The patient's treadmill stress test was performed today and read by the 
cardiologist as low risk treadmill stress test.

 

HOSPITALIZATION COURSE:  Morelia Leyva is a 53-year-old female who had a tooth 
extraction on Friday, and she presented on Monday with symptoms of near-syncope 
and chest pressure.  The patient had an extensive workup in the ED including 
MRI and MRA of the brain due to history of arachnoid cyst, which was 
unremarkable and please see history and physical from admission for details.  
Shortly, she was noted to have a slightly increased creatinine due to her 
history of status post one-sided nephrectomy due to kidney stones in the past.  
She appeared to be dehydrated.  She received gentle intravenous hydration.  She 
also had a tooth extraction and limited diet, likely contributed to her 
dehydration.  On 04/20/19, she underwent a treadmill stress test, which was 
read as low risk.  The patient no longer had symptoms of dizziness with 
standing up or chest pain.  The patient was educated about keeping hydrated and 
to continue her diet as recommended by Dr. Rajput from Oral Surgery.

 

Physical exam at discharge is unchanged from admission.

 

The patient's medications are unchanged from admission.

 

The patient is recommended to follow up with her primary care provider in 
approximately 4 to 7 days.

 

Please note that this is a short summary of the patient's hospital stay.  
Please refer to further medical records for details.

 

TIME SPENT:  Approximately 30 minutes was spent on the patient's discharge.



condition at discharge: stable

 

 [id number]/CPS #: [id number]

[email or web address]